# Patient Record
Sex: FEMALE | Race: WHITE
[De-identification: names, ages, dates, MRNs, and addresses within clinical notes are randomized per-mention and may not be internally consistent; named-entity substitution may affect disease eponyms.]

---

## 2017-01-03 ENCOUNTER — HOSPITAL ENCOUNTER (EMERGENCY)
Dept: HOSPITAL 61 - ER | Age: 27
Discharge: HOME | End: 2017-01-03
Payer: SELF-PAY

## 2017-01-03 VITALS — HEIGHT: 65 IN | BODY MASS INDEX: 33.32 KG/M2 | WEIGHT: 200 LBS

## 2017-01-03 VITALS — DIASTOLIC BLOOD PRESSURE: 80 MMHG | SYSTOLIC BLOOD PRESSURE: 140 MMHG

## 2017-01-03 DIAGNOSIS — F32.9: ICD-10-CM

## 2017-01-03 DIAGNOSIS — F41.9: ICD-10-CM

## 2017-01-03 DIAGNOSIS — M25.512: Primary | ICD-10-CM

## 2017-01-03 PROCEDURE — 99283 EMERGENCY DEPT VISIT LOW MDM: CPT

## 2017-09-03 ENCOUNTER — HOSPITAL ENCOUNTER (EMERGENCY)
Dept: HOSPITAL 61 - ER | Age: 27
LOS: 1 days | Discharge: HOME | End: 2017-09-04
Payer: COMMERCIAL

## 2017-09-03 VITALS — SYSTOLIC BLOOD PRESSURE: 124 MMHG | DIASTOLIC BLOOD PRESSURE: 58 MMHG

## 2017-09-03 VITALS — BODY MASS INDEX: 33.59 KG/M2 | WEIGHT: 209 LBS | HEIGHT: 66 IN
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
## 2017-09-03 DIAGNOSIS — S60.222A: ICD-10-CM

## 2017-09-03 DIAGNOSIS — S60.212A: Primary | ICD-10-CM

## 2017-09-03 PROCEDURE — 99284 EMERGENCY DEPT VISIT MOD MDM: CPT

## 2017-09-03 PROCEDURE — 73130 X-RAY EXAM OF HAND: CPT

## 2017-09-03 PROCEDURE — 73110 X-RAY EXAM OF WRIST: CPT

## 2017-09-04 NOTE — RAD
Examination: 3 views of the left wrist



History: History of pain, swelling after punching a wall 



Comparison: None available



Findings



The alignment of the carpal bones grossly appears unremarkable. There is faint

subtle sclerotic line identified in the midportion of the scaphoid probably

artifactual as is not visualized on the oblique view. However a fracture

lucency is not visualized in the scaphoid.



The alignment of the metacarpophalangeal joints grossly appears unremarkable



Impression:





1. No acute osseous findings.



2. ere is faint subtle sclerotic line identified in the midportion of the

scaphoid probably artifactual as is not visualized on the oblique view.

However a fracture lucency is not visualized in the scaphoid.

## 2017-09-04 NOTE — RAD
Examination: 3 views of the left hand



History: History of punched a wall, pain, swelling



Comparison: 1/22/2011



Findings:



The alignment of the metacarpophalangeal joint, interphalangeal grossly

appears unremarkable. No obvious acute fracture identified. There is mild soft

tissue swelling identified dorsal to the distal third metacarpal likely

secondary to soft tissue injury.



Impression



1. No acute osseous findings.



2. Mild soft tissue swelling identified dorsal to the distal third metacarpal

probably secondary to soft tissue injury.

## 2017-09-04 NOTE — PHYS DOC
Past Medical History


Past Medical History:  Anxiety, Depression


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


Past Surgical History:  No Surgical History


Alcohol Use:  None


Drug Use:  None





Adult General


Chief Complaint


Chief Complaint:  HAND PROBLEM





HPI


HPI


Patient is a 26  year old left-handed female presents with left wrist and hand 

pain after punching a wall closed fist. On exam, patient has bruising, swelling 

over her second third and fourth MCP joints. There is no gross deformity. There 

is soft tissue and mild bony tenderness. Range of motion and alignment is 

intact. The patient also has mild soft tissue tenderness over ulnar styloid 

process. There is no gross deformity. Range of motion is intact. No other 

injuries or complaints.





Review of Systems


Review of Systems


His symptoms as per history of present illness. All other review symptoms are 

negative.





Allergies


Allergies





Allergies








Coded Allergies Type Severity Reaction Last Updated Verified


 


  No Known Drug Allergies    6/10/16 No











Physical Exam


Physical Exam





Constitutional: Well developed, well nourished, no acute distress, non-toxic 

appearance. []


Extremities: On exam, patient has bruising, swelling over her second third and 

fourth MCP joints. There is no gross deformity. There is soft tissue and mild 

bony tenderness. Range of motion and alignment is intact. The patient also has 

mild soft tissue tenderness over ulnar styloid process. There is no gross 

deformity. Range of motion is intact. No other injuries or complaints


Neurologic: Left hand, no motor weakness or loss of sensation. []





Current Patient Data


Vital Signs





 Vital Signs








  Date Time  Temp Pulse Resp B/P (MAP) Pulse Ox O2 Delivery O2 Flow Rate FiO2


 


9/3/17 22:55 98.2 93 18  99 Room Air  





 98.2       











EKG


EKG


[]





Radiology/Procedures


Radiology/Procedures


[Left wrist/hand: No obvious displaced fracture.]





Course & Med Decision Making


Course & Med Decision Making


Pertinent Labs and Imaging studies reviewed. (See chart for details)





[Soft tissue injury without evidence of fracture.]





Dragon Disclaimer


Dragon Disclaimer


This electronic medical record was generated, in whole or in part, using a 

voice recognition dictation system.





Departure


Departure


Impression:  


 Primary Impression:  


 Contusion, hand


 Additional Impression:  


 Contusion, wrist


Disposition:  01 HOME, SELF-CARE


Patient Instructions:  Contusion, Easy-to-Read





Additional Instructions:  


Please wear Ace wrap for comfort and take ibuprofen for pain and apply ice. 

Follow-up with your PCP as needed.





Problem Qualifiers











PALMA,JHONY DO Sep 4, 2017 00:47

## 2018-07-26 ENCOUNTER — HOSPITAL ENCOUNTER (EMERGENCY)
Dept: HOSPITAL 61 - ER | Age: 28
Discharge: HOME | End: 2018-07-26
Payer: COMMERCIAL

## 2018-07-26 DIAGNOSIS — L25.9: Primary | ICD-10-CM

## 2018-07-26 DIAGNOSIS — F32.9: ICD-10-CM

## 2018-07-26 DIAGNOSIS — F41.9: ICD-10-CM

## 2018-07-26 PROCEDURE — 99283 EMERGENCY DEPT VISIT LOW MDM: CPT

## 2018-12-31 NOTE — PHYS DOC
Past Medical History


Past Medical History:  Anxiety, Depression


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


Past Surgical History:  No Surgical History


Alcohol Use:  None


Drug Use:  None





Adult General


Chief Complaint


Chief Complaint:  SHOULDER INJURY





[facility]


HPI


Patient is a 26  year old female presents emergency room with left shoulder 

pain after assisting her father to repair a tailpipe on the car. Patient states 

that she's had problems with her left shoulder the past. At one point, it was 

believed that she had a rotator cuff injury, but has not had a surgery 

performed to her left shoulder. Patient denies radiation of the pain. She 

denies chest pain or shortness of breath.





Review of Systems


Review of Systems





Constitutional: Denies fever or chills []


Eyes: Denies change in visual acuity, redness, or eye pain []


HENT: Denies nasal congestion or sore throat []


Respiratory: Denies cough or shortness of breath []


Cardiovascular: No additional information not addressed in HPI []


GI: Denies abdominal pain, nausea, vomiting, bloody stools or diarrhea []


: Denies dysuria or hematuria []


Musculoskeletal: Denies back pain or joint pain []


Integument: Denies rash or skin lesions []


Neurologic: Denies headache, focal weakness or sensory changes []


Endocrine: Denies polyuria or polydipsia []





Allergies


Allergies





 Allergies








Coded Allergies Type Severity Reaction Last Updated Verified


 


  No Known Drug Allergies    6/10/16 No











Physical Exam


Physical Exam





Constitutional: Well developed, well nourished, no acute distress, non-toxic 

appearance. []


HENT: Normocephalic, atraumatic, bilateral external ears normal, oropharynx 

moist, no oral exudates, nose normal. []


Eyes: PERRLA, EOMI, conjunctiva normal, no discharge. [] 


Neck: Normal range of motion, no tenderness, supple, no stridor. [] 


Cardiovascular:Heart rate regular rhythm, no murmur []


Lungs & Thorax:  Bilateral breath sounds clear to auscultation []


Abdomen: Bowel sounds normal, soft, no tenderness, no masses, no pulsatile 

masses. [] 


Skin: Warm, dry, no erythema, no rash. [] 


Back: No tenderness, no CVA tenderness. [] 


Extremities: Left shoulder is normal in appearance. There is tenderness to 

palpation to the bicep tendon region as well as to the posterior lateral aspect 

of the left deltoid. Is no palpable defect, deformity, instability or crepitus. 

Negative apprehension, NEER or [place]. She does report increased pain with 

abduction and circumduction. Left upper extremity is neurovascularly intact 

with strong radial pulse.


Neurologic: Alert and oriented X 3, normal motor function, normal sensory 

function, no focal deficits noted. []


Psychologic: Affect normal, judgement normal, mood normal. []





Current Patient Data


Vital Signs





 Vital Signs








  Date Time  Temp Pulse Resp B/P Pulse Ox O2 Delivery O2 Flow Rate FiO2


 


1/3/17 22:23 98.7 108 18  94 Room Air  





 98.7       











EKG


EKG


[]





Radiology/Procedures


Radiology/Procedures


[]





Course & Med Decision Making


Course & Med Decision Making


Pertinent Labs and Imaging studies reviewed. (See chart for details)





[]





Dragon Disclaimer


Dragon Disclaimer


This electronic medical record was generated, in whole or in part, using a 

voice recognition dictation system.





Departure


Departure


Impression:  


 Primary Impression:  


 Shoulder pain


Disposition:  01 HOME, SELF-CARE


Condition:  GOOD


Referrals:  


NO PCP (PCP)


Patient Instructions:  Arm Sling Use, Easy-to-Read, Shoulder Pain, Easy-to-Read





Additional Instructions:


1. Take the medications prescribed.


2. Wear the sling to appearance of activity. Perform the shoulder rotations and 

rowing exercises 3 times a day is demonstrated.


3. Review the discharge instructions for reasons to return to the emergency 

room.


4. Call the orthopedic doctors number listed in this paperwork tomorrow morning 

to schedule follow-up appointment.


Scripts


Hydrocodone/Apap 5-325 (Norco 5-325 Tablet)1 Each Tablet1 Tab PO PRN Q6HRS PRN 

breakthrough pain #10 TAB


   Prov:VICENTE MURRAY         1/3/17


Naproxen Sodium (Anaprox Ds)550 Mg [id number] Mg PO Q12HR #20 


   Prov:VICENTE MURRAY         1/3/17








VICENTE MURRAY Adam 3, 2017 22:38 room air

## 2020-02-09 ENCOUNTER — HOSPITAL ENCOUNTER (EMERGENCY)
Dept: HOSPITAL 61 - ER | Age: 30
Discharge: HOME | End: 2020-02-09
Payer: COMMERCIAL

## 2020-02-09 VITALS — HEIGHT: 66 IN | BODY MASS INDEX: 36.71 KG/M2 | WEIGHT: 228.4 LBS

## 2020-02-09 VITALS — DIASTOLIC BLOOD PRESSURE: 61 MMHG | SYSTOLIC BLOOD PRESSURE: 105 MMHG

## 2020-02-09 DIAGNOSIS — J06.9: Primary | ICD-10-CM

## 2020-02-09 DIAGNOSIS — R06.2: ICD-10-CM

## 2020-02-09 DIAGNOSIS — L53.9: ICD-10-CM

## 2020-02-09 DIAGNOSIS — R51: ICD-10-CM

## 2020-02-09 DIAGNOSIS — R05: ICD-10-CM

## 2020-02-09 DIAGNOSIS — R60.9: ICD-10-CM

## 2020-02-09 DIAGNOSIS — R09.81: ICD-10-CM

## 2020-02-09 PROCEDURE — 99283 EMERGENCY DEPT VISIT LOW MDM: CPT

## 2020-02-09 NOTE — PHYS DOC
Past Medical History


Past Medical History:  No Pertinent History


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


 (EUGENIO TORRES)


Past Surgical History:  No Surgical History


 (EUGENIO TORRES)


Smoking Status:  Never Smoker


Alcohol Use:  None


Drug Use:  None


 (EUGENIO TORRES)


Attending Signature


I have participated in the care of this patient and I have reviewed and agree 

with all pertinent clinical information above including history, exam, and 

recommendations.





 (QING BLANCA MD)





Adult General


Chief Complaint


Chief Complaint:  Congestion





HPI


HPI





Patient is a 29  year old female, accompanied by her father, who presents to the

emergency department with complaints of a sore throat, cough, painful 

inspiration, and headache for the last week. She denies any nausea, vomiting, 

diarrhea, abdominal pain, or rash. Patient states at times she has felt like she

is wheezing so she has used her father's inhaler and that has helped her jhon

athing. Patient denies any fever, headache, vision changes, numbness, tingling, 

or weakness. She currently rates her discomfort an 8 out of 10 on the pain 

scale, she denies any alleviating factors. Patient denies any travel or known 

exposure to influenza, however she does work at [employer] as a . 


 (EUGENIO TORRES)





Review of Systems


Review of Systems


All other ROS is negative unless otherwise noted in HPI.


 (EUGENIO TORRES)





Allergies


Allergies





Allergies








Coded Allergies Type Severity Reaction Last Updated Verified


 


  No Known Drug Allergies    6/10/16 No





 (QING BLANCA MD)





Physical Exam


Physical Exam


See Above


Constitutional: Well developed, well nourished, no acute distress, ill 

appearance


HENT: Normocephalic, atraumatic, bilateral external ears normal, bilateral TMs 

normal, posterior pharynx normal oropharynx moist, nose congested with erythema 

and edema of the nasal turbinates bilaterally


Eyes: PERRLA, conjunctiva injected bilaterally, no discharge. [] 


Neck: Normal range of motion, no stridor. [] 


Cardiovascular:Heart rate regular rhythm, no murmur []


Lungs & Thorax:  Bilateral breath sounds clear to auscultation, Respirations 

even and unlabored, no retractions, no respiratory distress


Skin: Warm, dry, no erythema, no rash. [] 


Back: No tenderness


Extremities: No cyanosis, ROM intact


Neurologic: Alert and oriented X 3, no focal deficits noted. []


Psychologic: Affect normal, judgement normal, mood normal.


 (EUGENIO TORRES)





Current Patient Data


Vital Signs





                                   Vital Signs








  Date Time  Temp Pulse Resp B/P (MAP) Pulse Ox O2 Delivery O2 Flow Rate FiO2


 


2/9/20 21:35 97.8 70 16 105/61 (76) 100 Room Air  





 97.8       





 (QING BLANCA MD)





EKG


EKG


[]


 (EUGENIO TORRES)





Radiology/Procedures


Radiology/Procedures


[]


 (EUGENIO TORRES)





Course & Med Decision Making


Course & Med Decision Making


Pertinent Labs and Imaging studies reviewed. (See chart for details)





[]


 (EUGENIO TORRES)





Dragon Disclaimer


Dragon Disclaimer


This electronic medical record was generated, in whole or in part, using a voice

 recognition dictation system.


 (EUGENIO TORRES)





Departure


Departure


Impression:  


   Primary Impression:  


   Influenza-like illness


   Additional Impression:  


   URI with cough and congestion


Disposition:  01 HOME, SELF-CARE


Condition:  STABLE


Referrals:  


NO PCP (PCP)


Patient Instructions:  Influenza, Adult, Easy-to-Read





Additional Instructions:  


Fill the prescription and take as directed. Alternate Tylenol and ibuprofen as 

needed for fever. Increase clear fluids and rest. Diet as tolerated. Recommend 

use of over-the-counter flu medications as needed for relief of your symptoms. 

Follow up with your primary care doctor if symptoms persist, return to the ER 

symptoms worsen.


Scripts


Benzonatate (TESSALON PERLE) 100 Mg Capsule


1 CAP PO TID PRN for COUGH for 7 Days, #21 CAP 0 Refills


   Prov: EUGENIO TORRES         2/9/20 


Albuterol Sulfate (Proair Hfa) 8.5 Gm Hfa.aer.ad


2 PUFF IH PRN Q4-6HRS PRN for wheezing for 21 Days, #1 INHALER 0 Refills


   Prov: EUGENIO TORRES         2/9/20





Problem Qualifiers











EUGENIO TORRES        Feb 9, 2020 22:22


QING BLANCA MD              Feb 10, 2020 05:26

## 2020-11-30 ENCOUNTER — HOSPITAL ENCOUNTER (EMERGENCY)
Dept: HOSPITAL 61 - ER | Age: 30
Discharge: HOME | End: 2020-11-30
Payer: COMMERCIAL

## 2020-11-30 ENCOUNTER — HOSPITAL ENCOUNTER (OUTPATIENT)
Dept: HOSPITAL 61 - LAB | Age: 30
End: 2020-11-30
Attending: OBSTETRICS & GYNECOLOGY
Payer: COMMERCIAL

## 2020-11-30 VITALS — WEIGHT: 235.89 LBS | HEIGHT: 66 IN | BODY MASS INDEX: 37.91 KG/M2

## 2020-11-30 VITALS
DIASTOLIC BLOOD PRESSURE: 69 MMHG | SYSTOLIC BLOOD PRESSURE: 119 MMHG | SYSTOLIC BLOOD PRESSURE: 119 MMHG | DIASTOLIC BLOOD PRESSURE: 69 MMHG

## 2020-11-30 DIAGNOSIS — N93.9: Primary | ICD-10-CM

## 2020-11-30 DIAGNOSIS — O20.0: Primary | ICD-10-CM

## 2020-11-30 DIAGNOSIS — Z3A.01: ICD-10-CM

## 2020-11-30 PROCEDURE — 36415 COLL VENOUS BLD VENIPUNCTURE: CPT

## 2020-11-30 PROCEDURE — 99282 EMERGENCY DEPT VISIT SF MDM: CPT

## 2020-11-30 PROCEDURE — 81025 URINE PREGNANCY TEST: CPT

## 2020-11-30 PROCEDURE — 84702 CHORIONIC GONADOTROPIN TEST: CPT

## 2020-11-30 NOTE — ED.ADGEN
Past Medical History


Past Medical History:  No Pertinent History


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


Past Surgical History:  No Surgical History


Smoking Status:  Never Smoker


Alcohol Use:  None


Drug Use:  None





General Adult


EDM:


Chief Complaint:  VAGINAL BLEEDING PREGNANCY





HPI:


HPI:





Patient is a 30  year old  1 female at approximately 4 weeks gestational age 

by last menstrual period.  Patient states that she has had some mild suprapubic 

cramping and faint vaginal bleeding since last night.  Is concerned because she 

is taken 3 home pregnancy tests which were positive.  She came in because her 

friends told her she should get checked out patient's other complaints otherwise

been well.





Review of Systems:


Review of Systems:


Constitutional:   Denies fever or chills. []


Eyes:   Denies change in visual acuity. []


HENT:   Denies nasal congestion or sore throat. [] 


Respiratory:   Denies cough or shortness of breath. [] 


Cardiovascular:   Denies chest pain or edema. [] 


GI:   Denies abdominal pain, nausea, vomiting, bloody stools or diarrhea. [] 


:  Denies dysuria. [] Vaginal bleeding, no discharge or dysuria


Musculoskeletal:   Denies back pain or joint pain. [] 


Integument:   Denies rash. [] 


Neurologic:   Denies headache, focal weakness or sensory changes. [] 


Endocrine:   Denies polyuria or polydipsia. [] 


Lymphatic:  Denies swollen glands. [] 


Psychiatric:  Denies depression or anxiety. []





Allergies:


Allergies:





Allergies








Coded Allergies Type Severity Reaction Last Updated Verified


 


  No Known Drug Allergies    6/10/16 No











Physical Exam:


PE:





Constitutional: Well developed, well nourished, no acute distress, non-toxic 

appearance. []


HENT: Normocephalic, atraumatic, bilateral external ears normal, oropharynx 

moist, no oral exudates, nose normal. []


Eyes: PERRLA, EOMI, conjunctiva normal, no discharge. [] 


Neck: Normal range of motion, no tenderness, supple, no stridor. [] 


Cardiovascular:Heart rate regular rhythm, no murmur []


Lungs & Thorax:  Bilateral breath sounds clear to auscultation []


Abdomen: Bowel sounds normal, soft, no tenderness, no masses, no pulsatile 

masses. [] 


Skin: Warm, dry, no erythema, no rash. [] 


Back: No tenderness, no CVA tenderness. [] 


Extremities: No tenderness, no cyanosis, no clubbing, ROM intact, no edema. [] 


Neurologic: Alert and oriented X 3, normal motor function, normal sensory 

function, no focal deficits noted. []


Psychologic: Affect normal, judgement normal, mood normal. []





Current Patient Data:


Labs:





                                Laboratory Tests








Test


 20


08:49


 


POC Urine HCG, Qualitative


 Borderline hcg


level








Vital Signs:





                                   Vital Signs








  Date Time  Temp Pulse Resp B/P (MAP) Pulse Ox O2 Delivery O2 Flow Rate FiO2


 


20 08:56 98.4 92 16 119/69 (86) 98 Room Air  





 98.4       











EKG:


EKG:


[]





Heart Score:


Risk Factors:


Risk Factors:  DM, Current or recent (<one month) smoker, HTN, HLP, family 

history of CAD, obesity.


Risk Scores:


Score 0 - 3:  2.5% MACE over next 6 weeks - Discharge Home


Score 4 - 6:  20.3% MACE over next 6 weeks - Admit for Clinical Observation


Score 7 - 10:  72.7% MACE over next 6 weeks - Early Invasive Strategies





Radiology/Procedures:


Radiology/Procedures:


[]





Course & Med Decision Making:


Course & Med Decision Making


Pertinent Labs and Imaging studies reviewed. (See chart for details)





[]





Dragon Disclaimer:


Dragon Disclaimer:


This electronic medical record was generated, in whole or in part, using a voice

 recognition dictation system.





Departure


Departure


Impression:  


   Primary Impression:  


   Threatened  in early pregnancy


Disposition:  01 DC HOME SELF CARE/HOMELESS


Referrals:  


NO PCP (PCP)


Patient Instructions:  Vaginal Bleeding During Pregnancy, Easy-to-Read











POPEYE DAIGLE MD              2020 09:25